# Patient Record
Sex: MALE | Race: WHITE | NOT HISPANIC OR LATINO | Employment: OTHER | URBAN - METROPOLITAN AREA
[De-identification: names, ages, dates, MRNs, and addresses within clinical notes are randomized per-mention and may not be internally consistent; named-entity substitution may affect disease eponyms.]

---

## 2020-10-30 ENCOUNTER — OFFICE VISIT (OUTPATIENT)
Dept: NEUROSURGERY | Facility: CLINIC | Age: 70
End: 2020-10-30

## 2020-10-30 VITALS
DIASTOLIC BLOOD PRESSURE: 68 MMHG | TEMPERATURE: 99.3 F | BODY MASS INDEX: 28.75 KG/M2 | SYSTOLIC BLOOD PRESSURE: 140 MMHG | HEART RATE: 63 BPM | WEIGHT: 200.8 LBS | HEIGHT: 70 IN | OXYGEN SATURATION: 98 %

## 2020-10-30 DIAGNOSIS — G93.89 CEREBRAL VENTRICULOMEGALY: Primary | ICD-10-CM

## 2020-10-30 PROCEDURE — 99203 OFFICE O/P NEW LOW 30 MIN: CPT | Performed by: NEUROLOGICAL SURGERY

## 2020-10-30 RX ORDER — HYDROCHLOROTHIAZIDE 25 MG/1
25 TABLET ORAL DAILY
COMMUNITY

## 2020-10-30 RX ORDER — CARVEDILOL 25 MG/1
25 TABLET ORAL 2 TIMES DAILY
COMMUNITY

## 2020-10-30 NOTE — PROGRESS NOTES
Subjective   History of Present Illness: Chan Roche is a 70 y.o. male is being seen for consultation today at the request of Juan Manuel Lambert MD for Hydrocephalus. He had MRI Brain at [place] 8/11/20. Today Mr. Roche reports overall he is feeling pretty good.  He reports that he had the MRI brain obtained because he had difficulty swallowing.  He reports a difficulty swallowing for approximately 20 years.  Reports that he passed out while trying to swallow food.  He denies any headaches.  Denies any memory problems.  Denies any episodes of confusion.  He reports occasional blurry vision.  Denies any urinary incontinence.. He reports that his swallowing problem has resolved since he had his esophagus dilated.  He reports some instability while walking.     History of Present Illness    The following portions of the patient's history were reviewed and updated as appropriate: allergies, current medications, past medical history, past social history, past surgical history and problem list.    Past Medical History:   Diagnosis Date   • Hypertension         Past Surgical History:   Procedure Laterality Date   • SHOULDER SURGERY      x2          Current Outpatient Medications:   •  carvedilol (COREG) 25 MG tablet, Take 25 mg by mouth 2 (two) times a day., Disp: , Rfl:   •  hydroCHLOROthiazide (HYDRODIURIL) 25 MG tablet, Take 25 mg by mouth Daily., Disp: , Rfl:      Social History     Socioeconomic History   • Marital status:      Spouse name: Not on file   • Number of children: Not on file   • Years of education: Not on file   • Highest education level: Not on file      No Known Allergies    History reviewed. No pertinent family history.     Review of Systems   Constitutional: Negative for chills and fever.   HENT: Positive for trouble swallowing (mild ). Negative for tinnitus.    Eyes: Negative for redness and visual disturbance.   Respiratory: Negative for cough and shortness of breath.   "  Cardiovascular: Negative for chest pain, palpitations and leg swelling.   Gastrointestinal: Negative for abdominal pain, constipation and diarrhea.   Genitourinary: Negative for difficulty urinating and frequency.        Denies bowel or bladder incontinence   Musculoskeletal: Positive for gait problem (off balance at times, feels wobbly). Negative for back pain and neck pain.   Neurological: Positive for dizziness. Negative for speech difficulty, weakness, light-headedness, numbness and headaches.   Psychiatric/Behavioral: Negative for confusion, decreased concentration and sleep disturbance.       Objective     Vitals:    10/30/20 1516   BP: 140/68   Pulse: 63   Temp: 99.3 °F (37.4 °C)   SpO2: 98%   Weight: 91.1 kg (200 lb 12.8 oz)   Height: 177.8 cm (70\")     Body mass index is 28.81 kg/m².      Physical Exam  Neurologic Exam  Awake, alert, oriented x3  Pupils equal round reactive to light  Extraocular muscles intact  Face symmetric  Speech is fluent and clear  No pronator drift  Motor exam  Bilateral deltoids 5/5, bilateral biceps 5/5, bilateral triceps 5/5, bilateral wrist extension 5/5 bilateral hand  5/5  Bilateral hip flexion 5/5, bilateral knee extension 5/5, bilateral DF/PF 5/5  No clonus  No Shane's reflex  2+ bilateral patellar reflexes  2+ bilateral biceps reflex  Steady normal gait  Able to walk heel-to-toe without difficulty  Able to detect  light touch in all 4 extremities      Assessment/Plan   Independent Review of Radiographic Studies:      I personally reviewed the images from the following studies.  MRI brain with and without contrast reviewed  These images demonstrate ventriculomegaly without transependymal edema.  The occipital horns appear to be larger compared to the frontal horns.  There is also a cisterna magna.  There is no evidence of obstruction of the fourth ventricle    Medical Decision Makin-year-old male with incidental ventriculomegaly on MRI to evaluate for " swallowing difficulties.  -He reports that his swallowing difficulties have resolved since he had his esophagus dilated.  -I asked him specifically about symptoms associated with normal pressure hydrocephalus including confusion, memory problems, instability while walking, urinary incontinence.  He reports he has some gait instability but denies other symptoms.  Also denies headaches  -I suspect that this is an incidental finding however I will refer him to ophthalmology to evaluate for papilledema  -I will plan to have him follow-up in 1 month to evaluate with a CT head for any increase in ventricle size      Diagnoses and all orders for this visit:    1. Cerebral ventriculomegaly (Primary)      Return in about 4 weeks (around 11/27/2020).

## 2020-11-02 ENCOUNTER — TELEPHONE (OUTPATIENT)
Dept: NEUROSURGERY | Facility: CLINIC | Age: 70
End: 2020-11-02